# Patient Record
Sex: FEMALE | Race: WHITE
[De-identification: names, ages, dates, MRNs, and addresses within clinical notes are randomized per-mention and may not be internally consistent; named-entity substitution may affect disease eponyms.]

---

## 2021-07-17 ENCOUNTER — HOSPITAL ENCOUNTER (EMERGENCY)
Dept: HOSPITAL 75 - ER | Age: 66
Discharge: HOME | End: 2021-07-17
Payer: COMMERCIAL

## 2021-07-17 VITALS — HEIGHT: 66.02 IN | BODY MASS INDEX: 28.81 KG/M2 | WEIGHT: 179.24 LBS

## 2021-07-17 VITALS — SYSTOLIC BLOOD PRESSURE: 143 MMHG | DIASTOLIC BLOOD PRESSURE: 88 MMHG

## 2021-07-17 DIAGNOSIS — U07.1: Primary | ICD-10-CM

## 2021-07-17 LAB
ALBUMIN SERPL-MCNC: 4.2 GM/DL (ref 3.2–4.5)
ALP SERPL-CCNC: 62 U/L (ref 40–136)
ALT SERPL-CCNC: 20 U/L (ref 0–55)
BASOPHILS # BLD AUTO: 0 10^3/UL (ref 0–0.1)
BASOPHILS NFR BLD AUTO: 0 % (ref 0–10)
BILIRUB SERPL-MCNC: 0.3 MG/DL (ref 0.1–1)
BUN/CREAT SERPL: 10
CALCIUM SERPL-MCNC: 9.2 MG/DL (ref 8.5–10.1)
CHLORIDE SERPL-SCNC: 106 MMOL/L (ref 98–107)
CO2 SERPL-SCNC: 23 MMOL/L (ref 21–32)
CREAT SERPL-MCNC: 1.05 MG/DL (ref 0.6–1.3)
EOSINOPHIL # BLD AUTO: 0 10^3/UL (ref 0–0.3)
EOSINOPHIL NFR BLD AUTO: 0 % (ref 0–10)
GFR SERPLBLD BASED ON 1.73 SQ M-ARVRAT: 52 ML/MIN
GLUCOSE SERPL-MCNC: 99 MG/DL (ref 70–105)
HCT VFR BLD CALC: 46 % (ref 35–52)
HGB BLD-MCNC: 15.1 G/DL (ref 11.5–16)
LYMPHOCYTES # BLD AUTO: 0.8 10^3/UL (ref 1–4)
LYMPHOCYTES NFR BLD AUTO: 15 % (ref 12–44)
MANUAL DIFFERENTIAL PERFORMED BLD QL: NO
MCH RBC QN AUTO: 29 PG (ref 25–34)
MCHC RBC AUTO-ENTMCNC: 33 G/DL (ref 32–36)
MCV RBC AUTO: 87 FL (ref 80–99)
MONOCYTES # BLD AUTO: 0.7 10^3/UL (ref 0–1)
MONOCYTES NFR BLD AUTO: 14 % (ref 0–12)
NEUTROPHILS # BLD AUTO: 3.7 10^3/UL (ref 1.8–7.8)
NEUTROPHILS NFR BLD AUTO: 70 % (ref 42–75)
PLATELET # BLD: 163 10^3/UL (ref 130–400)
PMV BLD AUTO: 9.1 FL (ref 9–12.2)
POTASSIUM SERPL-SCNC: 4 MMOL/L (ref 3.6–5)
PROT SERPL-MCNC: 7.7 GM/DL (ref 6.4–8.2)
SODIUM SERPL-SCNC: 142 MMOL/L (ref 135–145)
WBC # BLD AUTO: 5.4 10^3/UL (ref 4.3–11)

## 2021-07-17 PROCEDURE — 74176 CT ABD & PELVIS W/O CONTRAST: CPT

## 2021-07-17 PROCEDURE — 87636 SARSCOV2 & INF A&B AMP PRB: CPT

## 2021-07-17 PROCEDURE — 80053 COMPREHEN METABOLIC PANEL: CPT

## 2021-07-17 PROCEDURE — 85025 COMPLETE CBC W/AUTO DIFF WBC: CPT

## 2021-07-17 PROCEDURE — 36415 COLL VENOUS BLD VENIPUNCTURE: CPT

## 2021-07-17 NOTE — DIAGNOSTIC IMAGING REPORT
PROCEDURE: CT abdomen and pelvis without contrast.



TECHNIQUE: Multiple contiguous axial images were obtained through

the abdomen and pelvis without the use of intravenous contrast.

Auto Exposure Controls were utilized during the CT exam to meet

ALARA standards for radiation dose reduction. 



INDICATION: Left lower quadrant pain, diverticulitis,

hysterectomy, Covid positive.



COMPARISON STUDY: CT of the abdomen and pelvis from 09/18/2015.



FINDINGS: The lung bases are clear. The liver, gallbladder,

spleen, pancreas, adrenal glands and kidneys are normal. The

appendix is not identified and is probably surgically absent.

Urinary bladder and prostate gland are normal. No hernia is

present. Diverticuli are present mainly in the sigmoid and

descending colon. No inflammatory changes are present. The

osseous structures demonstrate some mild degenerative changes.



IMPRESSION: There is diverticulosis. No inflammation is

identified. 



Dictated by: 



  Dictated on workstation # [id number]

## 2021-07-17 NOTE — ED ABDOMINAL PAIN
General


Chief Complaint:  Abdominal/GI Problems


Stated Complaint:  DIVERTICULITIS/ABD PAIN/SINUS INF


Nursing Triage Note:  


1155 PATIENT WAS TRIAGE BY TECH. TO ROOM AT 1306 C/O ABD PAIN WAS SEEN BY NP 


YESTERDAY AND STARTED ON FLAGY AND CIPRO NOT ANY BETTER.


Source of Information:  Patient


Exam Limitations:  No Limitations





History of Present Illness


Date Seen by Provider:  2021


Time Seen by Provider:  13:20


Initial Comments


Patient is a 66-year-old female who presents to the emergency department today 

with a chief complaint of left lower quadrant abdominal pain.  Patient states 

onset of symptoms about a week ago.  She saw her primary care doctor's nurse 

practitioner yesterday and was started on Cipro and Flagyl.  Patient continues 

to have diffuse abdominal discomfort and it feels like "a ton of bricks on my 

abdomen".  Patient also complains of some sinus congestion and a little 

shortness of breath with exertion, body aches and feeling flushed intermittently

since Wednesday.  Patient states that she has no known Covid positive contacts. 

She is not Covid vaccinated.  She denies severe headache.  She has had a little 

diarrhea likely is related to her left lower quadrant pain/diverticulitis.  She 

has had a little productive cough.





All other review of systems reviewed and negative except as stated above.


Timing/Duration:  1 Week


Severity/Quality:  Full


Location:  Generalized Abdomen


Radiation:  LLQ


Activities at Onset:  None


Modifying Factors:  Improves With Other (Antibiotics)


Associated Symptoms:  Fever/Chills ("Flushed"), Fatigue, Nausea/Vomiting (A 

little nausea), Shortness of Air (Shortness of air with exertion)





Allergies and Home Medications


Allergies


Coded Allergies:  


     No Allergy Information Available (Unverified , 9/18/15)





Patient Home Medication List


Home Medication List Reviewed:  Yes





Review of Systems


Review of Systems


Constitutional:  see HPI, other (Flushed)


EENTM:  No Symptoms Reported


Respiratory:  SOA With Exertion


Cardiovascular:  No Symptoms Reported


Gastrointestinal:  Abdominal Pain


Genitourinary:  No Symptoms Reported


Musculoskeletal:  no symptoms reported


Skin:  no symptoms reported


Psychiatric/Neurological:  No Symptoms Reported


Endocrine:  Flushing





All Other Systems Reviewed


Negative Unless Noted:  Yes





Past Medical-Social-Family Hx


Patient Social History


Substance use?:  No





Physical Exam


Vital Signs





Vital Signs - First Documented








 21





 13:06


 


Temp 37.1


 


Pulse 99


 


Resp 18


 


B/P (MAP) 143/88 (106)


 


Pulse Ox 98


 


O2 Delivery Room Air





Capillary Refill :


Height/Weight/BMI


Height: '"


Weight: lbs. oz. kg; 28.00 BMI


Method:


General Appearance:  WD/WN, no apparent distress


HEENT:  normal ENT inspection


Neck:  full range of motion


Respiratory:  lungs clear, normal breath sounds, no respiratory distress, no 

accessory muscle use


Cardiovascular:  regular rate, rhythm


Gastrointestinal:  normal bowel sounds, soft; No abnormal bowel sounds; 

tenderness (Left lower quadrant and left upper quadrant)


Extremities:  normal inspection, no pedal edema, no calf tenderness


Neurologic/Psychiatric:  alert, normal mood/affect, oriented x 3


Skin:  normal color, warm/dry





Progress/Results/Core Measures


Results/Orders


Lab Results





Laboratory Tests








Test


 21


13:12 21


14:12 Range/Units


 


 


White Blood Count


 5.4 


 


 4.3-11.0


10^3/uL


 


Red Blood Count


 5.26 H


 


 3.80-5.11


10^6/uL


 


Hemoglobin 15.1   11.5-16.0  g/dL


 


Hematocrit 46   35-52  %


 


Mean Corpuscular Volume 87   80-99  fL


 


Mean Corpuscular Hemoglobin 29   25-34  pg


 


Mean Corpuscular Hemoglobin


Concent 33 


 


 32-36  g/dL





 


Red Cell Distribution Width 13.1   10.0-14.5  %


 


Platelet Count


 163 


 


 130-400


10^3/uL


 


Mean Platelet Volume 9.1   9.0-12.2  fL


 


Immature Granulocyte % (Auto) 0    %


 


Neutrophils (%) (Auto) 70   42-75  %


 


Lymphocytes (%) (Auto) 15   12-44  %


 


Monocytes (%) (Auto) 14 H  0-12  %


 


Eosinophils (%) (Auto) 0   0-10  %


 


Basophils (%) (Auto) 0   0-10  %


 


Neutrophils # (Auto)


 3.7 


 


 1.8-7.8


10^3/uL


 


Lymphocytes # (Auto)


 0.8 L


 


 1.0-4.0


10^3/uL


 


Monocytes # (Auto)


 0.7 


 


 0.0-1.0


10^3/uL


 


Eosinophils # (Auto)


 0.0 


 


 0.0-0.3


10^3/uL


 


Basophils # (Auto)


 0.0 


 


 0.0-0.1


10^3/uL


 


Immature Granulocyte # (Auto)


 0.0 


 


 0.0-0.1


10^3/uL


 


Sodium Level 142   135-145  MMOL/L


 


Potassium Level 4.0   3.6-5.0  MMOL/L


 


Chloride Level 106     MMOL/L


 


Carbon Dioxide Level 23   21-32  MMOL/L


 


Anion Gap 13   5-14  MMOL/L


 


Blood Urea Nitrogen 10   7-18  MG/DL


 


Creatinine


 1.05 


 


 0.60-1.30


MG/DL


 


Estimat Glomerular Filtration


Rate 52 


 


  





 


BUN/Creatinine Ratio 10    


 


Glucose Level 99     MG/DL


 


Calcium Level 9.2   8.5-10.1  MG/DL


 


Corrected Calcium 9.0   8.5-10.1  MG/DL


 


Total Bilirubin 0.3   0.1-1.0  MG/DL


 


Aspartate Amino Transf


(AST/SGOT) 24 


 


 5-34  U/L





 


Alanine Aminotransferase


(ALT/SGPT) 20 


 


 0-55  U/L





 


Alkaline Phosphatase 62     U/L


 


Total Protein 7.7   6.4-8.2  GM/DL


 


Albumin 4.2   3.2-4.5  GM/DL


 


SARS-CoV-2 RNA (RT-PCR)  Detected H Not Detecte  








My Orders





Orders - RADHA GR MD


Ed Iv/Invasive Line Start (21 13:33)


Cbc With Automated Diff (21 13:33)


Comprehensive Metabolic Panel (21 13:33)


Covid 19 Inhouse Test (21 13:33)


Ct Abdomen/Pelvis Wo (21 13:33)





Vital Signs/I&O











 21





 13:06


 


Temp 37.1


 


Pulse 99


 


Resp 18


 


B/P (MAP) 143/88 (106)


 


Pulse Ox 98


 


O2 Delivery Room Air














Blood Pressure Mean:                    106











Progress


Progress Note :  


   Time:  15:44


Progress Note


Discussed with patient Regeneron therapy.  Advised her that the monoclonal 

antibody is an unapproved drug that is scheduled now for authorized use under 

the EUA.  I did discuss the risks and benefits and alternatives to receiving 

Regeneron.  To include possible allergic reaction and possible worsening of 

Covid symptoms and anaphylaxis.  Patient wishes to proceed.  Order has been 

written.





1629


Patient CT looks good no evidence of inflammatory changes in the sigmoid or 

descending colon.  Patient's symptoms are likely all related to Covid.  Patient 

is comfortable with discharge to home.  I recommended Tylenol, ibuprofen, 

fluids.  She is sent with the Regeneron order.  I have advised her that the 

pharmacy will contact her with an appointment time for infusion.  She verbalized

understanding.  All questions were sought and answered.  Patient is stable for 

discharge.





Diagnostic Imaging





   Diagonstic Imaging:  CT


Comments


                 [facility] [place], [place]





NAME:   MELINDA STEPHENS


[facility] REC#:   [id number]


ACCOUNT#:   [id number]


PT STATUS:   REG ER


:   1955


PHYSICIAN:   RADHA GR MD


ADMIT DATE:   21/ER


                                  ***Signed***


Date of Exam:21





CT ABDOMEN/PELVIS WO








PROCEDURE: CT abdomen and pelvis without contrast.





TECHNIQUE: Multiple contiguous axial images were obtained through


the abdomen and pelvis without the use of intravenous contrast.


Auto Exposure Controls were utilized during the CT exam to meet


ALARA standards for radiation dose reduction. 





INDICATION: Left lower quadrant pain, diverticulitis,


hysterectomy, Covid positive.





COMPARISON STUDY: CT of the abdomen and pelvis from 2015.





FINDINGS: The lung bases are clear. The liver, gallbladder,


spleen, pancreas, adrenal glands and kidneys are normal. The


appendix is not identified and is probably surgically absent.


Urinary bladder and prostate gland are normal. No hernia is


present. Diverticuli are present mainly in the sigmoid and


descending colon. No inflammatory changes are present. The


osseous structures demonstrate some mild degenerative changes.





IMPRESSION: There is diverticulosis. No inflammation is


identified. 





Dictated by: 





  Dictated on workstation # [id number]








Dict:   21 1534


Trans:   21 1608


[facility] [id number]





Interpreted by:     ANNA SPRINGER MD


Electronically signed by: ANNA SPRINGER MD 21 1608





Departure


Impression





   Primary Impression:  


   COVID-19


Disposition:  01 HOME, SELF-CARE


Condition:  Stable





Departure-Patient Inst.


Referrals:  


MARIA M FISHMAN MD (PCP/Family)


Primary Care Physician


Patient Instructions:  COVID-19 Overview





Add. Discharge Instructions:  


Drink plenty of fluids to stay well-hydrated.





Alternate Tylenol and ibuprofen as needed for pain and fever.





The pharmacy here at [facility] will call you with an infusion time for your 

Regeneron.  Please keep this appointment.





Come back to the emergency room if you have any worsening symptoms, shortness of

breath, severe headache, chest pain or any other emergent concerns.











RADHA GR MD         2021 13:37

## 2021-07-22 ENCOUNTER — HOSPITAL ENCOUNTER (OUTPATIENT)
Dept: HOSPITAL 75 - INFUSION | Age: 66
End: 2021-07-22
Attending: EMERGENCY MEDICINE
Payer: MEDICARE

## 2021-07-22 VITALS — SYSTOLIC BLOOD PRESSURE: 134 MMHG | DIASTOLIC BLOOD PRESSURE: 64 MMHG

## 2021-07-22 VITALS — WEIGHT: 179.24 LBS | HEIGHT: 66.02 IN | BODY MASS INDEX: 28.81 KG/M2

## 2021-07-22 VITALS — SYSTOLIC BLOOD PRESSURE: 135 MMHG | DIASTOLIC BLOOD PRESSURE: 72 MMHG

## 2021-07-22 DIAGNOSIS — Z23: Primary | ICD-10-CM

## 2021-07-22 DIAGNOSIS — U07.1: ICD-10-CM

## 2023-08-01 ENCOUNTER — HOSPITAL ENCOUNTER (EMERGENCY)
Dept: HOSPITAL 75 - ER | Age: 68
Discharge: HOME | End: 2023-08-01
Payer: MEDICARE

## 2023-08-01 VITALS — SYSTOLIC BLOOD PRESSURE: 148 MMHG | DIASTOLIC BLOOD PRESSURE: 65 MMHG

## 2023-08-01 DIAGNOSIS — R50.9: ICD-10-CM

## 2023-08-01 DIAGNOSIS — R10.32: Primary | ICD-10-CM

## 2023-08-01 DIAGNOSIS — R00.0: ICD-10-CM

## 2023-08-01 DIAGNOSIS — B34.9: ICD-10-CM

## 2023-08-01 DIAGNOSIS — Z86.16: ICD-10-CM

## 2023-08-01 DIAGNOSIS — R11.0: ICD-10-CM

## 2023-08-01 DIAGNOSIS — Z87.19: ICD-10-CM

## 2023-08-01 DIAGNOSIS — R79.82: ICD-10-CM

## 2023-08-01 LAB
ALBUMIN SERPL-MCNC: 4.4 GM/DL (ref 3.2–4.5)
ALP SERPL-CCNC: 75 U/L (ref 40–136)
ALT SERPL-CCNC: 16 U/L (ref 0–55)
AMORPH SED URNS QL MICRO: (no result) /LPF
APTT BLD: 29 SEC (ref 24–35)
APTT PPP: YELLOW S
BACTERIA #/AREA URNS HPF: (no result) /HPF
BASOPHILS # BLD AUTO: 0 10^3/UL (ref 0–0.1)
BASOPHILS NFR BLD AUTO: 1 % (ref 0–10)
BASOPHILS NFR BLD MANUAL: 1 %
BILIRUB SERPL-MCNC: 0.6 MG/DL (ref 0.1–1)
BILIRUB UR QL STRIP: NEGATIVE
BUN/CREAT SERPL: 17
CALCIUM SERPL-MCNC: 9.5 MG/DL (ref 8.5–10.1)
CHLORIDE SERPL-SCNC: 106 MMOL/L (ref 98–107)
CO2 SERPL-SCNC: 20 MMOL/L (ref 21–32)
CREAT SERPL-MCNC: 0.88 MG/DL (ref 0.6–1.3)
EOSINOPHIL # BLD AUTO: 0.2 10^3/UL (ref 0–0.3)
EOSINOPHIL NFR BLD AUTO: 2 % (ref 0–10)
EOSINOPHIL NFR BLD MANUAL: 1 %
FIBRINOGEN PPP-MCNC: CLEAR MG/DL
GFR SERPLBLD BASED ON 1.73 SQ M-ARVRAT: 72 ML/MIN
GLUCOSE SERPL-MCNC: 112 MG/DL (ref 70–105)
GLUCOSE UR STRIP-MCNC: NEGATIVE MG/DL
HCT VFR BLD CALC: 41 % (ref 35–52)
HGB BLD-MCNC: 14 G/DL (ref 11.5–16)
INR PPP: 1 (ref 0.8–1.4)
KETONES UR QL STRIP: NEGATIVE
LEUKOCYTE ESTERASE UR QL STRIP: (no result)
LIPASE SERPL-CCNC: 20 U/L (ref 8–78)
LYMPHOCYTES # BLD AUTO: 0.4 10^3/UL (ref 1–4)
LYMPHOCYTES NFR BLD AUTO: 5 % (ref 12–44)
MANUAL DIFFERENTIAL PERFORMED BLD QL: YES
MCH RBC QN AUTO: 29 PG (ref 25–34)
MCHC RBC AUTO-ENTMCNC: 34 G/DL (ref 32–36)
MCV RBC AUTO: 85 FL (ref 80–99)
MONOCYTES # BLD AUTO: 0.8 10^3/UL (ref 0–1)
MONOCYTES NFR BLD AUTO: 9 % (ref 0–12)
MONOCYTES NFR BLD: 8 %
NEUTROPHILS # BLD AUTO: 6.9 10^3/UL (ref 1.8–7.8)
NEUTROPHILS NFR BLD AUTO: 83 % (ref 42–75)
NEUTS BAND NFR BLD MANUAL: 86 %
NITRITE UR QL STRIP: NEGATIVE
PH UR STRIP: 7 [PH] (ref 5–9)
PLATELET # BLD EST: ADEQUATE 10*3/UL
PLATELET # BLD: 190 10^3/UL (ref 130–400)
PLATELET CLUMP BLD QL SMEAR: SLIGHT
PMV BLD AUTO: 9 FL (ref 9–12.2)
POTASSIUM SERPL-SCNC: 3.6 MMOL/L (ref 3.6–5)
PROT SERPL-MCNC: 7.7 GM/DL (ref 6.4–8.2)
PROT UR QL STRIP: NEGATIVE
PROTHROMBIN TIME: 13 SEC (ref 12.2–14.7)
RBC #/AREA URNS HPF: (no result) /HPF
RBC MORPH BLD: NORMAL
SODIUM SERPL-SCNC: 138 MMOL/L (ref 135–145)
SP GR UR STRIP: <=1.005 (ref 1.02–1.02)
VARIANT LYMPHS NFR BLD MANUAL: 1 %
VARIANT LYMPHS NFR BLD MANUAL: 3 %
WBC # BLD AUTO: 8.3 10^3/UL (ref 4.3–11)
WBC #/AREA URNS HPF: (no result) /HPF

## 2023-08-01 PROCEDURE — 85610 PROTHROMBIN TIME: CPT

## 2023-08-01 PROCEDURE — 87088 URINE BACTERIA CULTURE: CPT

## 2023-08-01 PROCEDURE — 85007 BL SMEAR W/DIFF WBC COUNT: CPT

## 2023-08-01 PROCEDURE — 74177 CT ABD & PELVIS W/CONTRAST: CPT

## 2023-08-01 PROCEDURE — 87040 BLOOD CULTURE FOR BACTERIA: CPT

## 2023-08-01 PROCEDURE — 81000 URINALYSIS NONAUTO W/SCOPE: CPT

## 2023-08-01 PROCEDURE — 86141 C-REACTIVE PROTEIN HS: CPT

## 2023-08-01 PROCEDURE — 85730 THROMBOPLASTIN TIME PARTIAL: CPT

## 2023-08-01 PROCEDURE — 36415 COLL VENOUS BLD VENIPUNCTURE: CPT

## 2023-08-01 PROCEDURE — 83605 ASSAY OF LACTIC ACID: CPT

## 2023-08-01 PROCEDURE — 83690 ASSAY OF LIPASE: CPT

## 2023-08-01 PROCEDURE — 85027 COMPLETE CBC AUTOMATED: CPT

## 2023-08-01 PROCEDURE — 80053 COMPREHEN METABOLIC PANEL: CPT

## 2023-08-01 NOTE — ED ABDOMINAL PAIN
General


Chief Complaint:  Abdominal/GI Problems


Stated Complaint:  FEVER, ABD PAIN


Source of Information:  Patient


Exam Limitations:  No Limitations





History of Present Illness


Date Seen by Provider:  Aug 1, 2023


Time Seen by Provider:  20:04


Initial Comments


68-year-old female with past medical history most notable for diverticulitis 

coming in due to 1-1/2 weeks of left lower quadrant abdominal pain, sharp, 

moderate to severe with associated nausea.  Had a normal bowel yesterday with no

blood.  He states this feels similar to prior episodes of diverticulitis for 

which she is never required surgery in the past.  Denies any dysuria, vaginal 

bleeding, vaginal discharge, vomiting, diarrhea, weakness, numbness, chest pain,

shortness of breath, or any other concerns.  She notes that she started having a

fever today, took ibuprofen and Tylenol roughly an hour prior to arrival.





Allergies and Home Medications


Allergies


Coded Allergies:  


     No Known Drug Allergies (Unverified , 21)





Patient Home Medication List


Home Medication List Reviewed:  Yes


Ondansetron (Ondansetron Odt) 4 Mg Tab.rapdis, 4 MG SL Q6H PRN for NAUSEA/VOMIT

ING


   Prescribed by: ÁNGEL GOLDSTEIN on 23





Review of Systems


Review of Systems


Constitutional:  fever


EENTM:  No Symptoms Reported


Respiratory:  No Symptoms Reported


Cardiovascular:  No Symptoms Reported


Gastrointestinal:  See HPI


Genitourinary:  No Symptoms Reported


Musculoskeletal:  no symptoms reported


Skin:  no symptoms reported


Psychiatric/Neurological:  No Symptoms Reported


Endocrine:  No Symptoms Reported


Hematologic/Lymphatic:  No Symptoms Reported





Past Medical-Social-Family Hx


Patient Social History


Tobacco Use?:  No


Substance use?:  No


Alcohol Use?:  No


Pt feels they are or have been:  No





Past Medical History


Surgery/Hospitalization HX:  


HYST, DIVERTICULITIS


Surgeries:  Yes


Hysterectomy, Tubal Ligation





Physical Exam


Vital Signs





Vital Signs - First Documented








 23





 20:07


 


Temp 39.1


 


Pulse 129


 


Resp 18


 


B/P (MAP) 175/118 (137)


 


Pulse Ox 96


 


O2 Delivery Room Air





Capillary Refill :


Height/Weight/BMI


Height: '"


Weight: lbs. oz. kg; 28.00 BMI


Method:


General Appearance:  WD/WN, no apparent distress


HEENT:  PERRL/EOMI, normal ENT inspection, pharynx normal


Neck:  non-tender, full range of motion, supple, normal inspection


Respiratory:  chest non-tender, lungs clear, normal breath sounds, no 

respiratory distress, no accessory muscle use


Cardiovascular:  no edema, no murmur, tachycardia


Gastrointestinal:  normal bowel sounds, soft; No distended, No guarding, No 

rebound; tenderness


Extremities:  normal range of motion, non-tender, normal inspection, no pedal 

edema, no calf tenderness, normal capillary refill


Back:  normal inspection, no CVA tenderness


Neurologic/Psychiatric:  no motor/sensory deficits, alert, normal mood/affect


Skin:  normal color, warm/dry





Focused Exam


Lactate Level


23 20:15: Lactic Acid Level 1.15





Lactic Acid Level





Laboratory Tests








Test


 23


20:15


 


Lactic Acid Level


 1.15 MMOL/L


(0.50-2.00)











Progress/Results/Core Measures


Results/Orders


Lab Results





Laboratory Tests








Test


 23


20:15 23


20:50 Range/Units


 


 


White Blood Count


 8.3 


 


 4.3-11.0


10^3/uL


 


Red Blood Count


 4.87 


 


 3.80-5.11


10^6/uL


 


Hemoglobin 14.0   11.5-16.0  g/dL


 


Hematocrit 41   35-52  %


 


Mean Corpuscular Volume 85   80-99  fL


 


Mean Corpuscular Hemoglobin 29   25-34  pg


 


Mean Corpuscular Hemoglobin


Concent 34 


 


 32-36  g/dL





 


Red Cell Distribution Width 13.2   10.0-14.5  %


 


Platelet Count


 190 


 


 130-400


10^3/uL


 


Mean Platelet Volume 9.0   9.0-12.2  fL


 


Immature Granulocyte % (Auto) 0    %


 


Neutrophils (%) (Auto) 83 H  42-75  %


 


Lymphocytes (%) (Auto) 5 L  12-44  %


 


Monocytes (%) (Auto) 9   0-12  %


 


Eosinophils (%) (Auto) 2   0-10  %


 


Basophils (%) (Auto) 1   0-10  %


 


Neutrophils # (Auto)


 6.9 


 


 1.8-7.8


10^3/uL


 


Lymphocytes # (Auto)


 0.4 L


 


 1.0-4.0


10^3/uL


 


Monocytes # (Auto)


 0.8 


 


 0.0-1.0


10^3/uL


 


Eosinophils # (Auto)


 0.2 


 


 0.0-0.3


10^3/uL


 


Basophils # (Auto)


 0.0 


 


 0.0-0.1


10^3/uL


 


Immature Granulocyte # (Auto)


 0.0 


 


 0.0-0.1


10^3/uL


 


Neutrophils % (Manual) 86    %


 


Lymphocytes % (Manual) 3    %


 


Monocytes % (Manual) 8    %


 


Eosinophils % (Manual) 1    %


 


Basophils % (Manual) 1    %


 


Atypical Lymphocytes 1    %


 


Platelet Estimate ADEQUATE    


 


Clumped Platelets SLIGHT    


 


Blood Morphology Comment NORMAL    


 


Prothrombin Time 13.0   12.2-14.7  SEC


 


INR Comment 1.0   0.8-1.4  


 


Activated Partial


Thromboplast Time 29 


 


 24-35  SEC





 


Sodium Level 138   135-145  MMOL/L


 


Potassium Level 3.6   3.6-5.0  MMOL/L


 


Chloride Level 106     MMOL/L


 


Carbon Dioxide Level 20 L  21-32  MMOL/L


 


Anion Gap 12   5-14  MMOL/L


 


Blood Urea Nitrogen 15   7-18  MG/DL


 


Creatinine


 0.88 


 


 0.60-1.30


MG/DL


 


Estimat Glomerular Filtration


Rate 72 


 


  





 


BUN/Creatinine Ratio 17    


 


Glucose Level 112 H    MG/DL


 


Lactic Acid Level


 1.15 


 


 0.50-2.00


MMOL/L


 


Calcium Level 9.5   8.5-10.1  MG/DL


 


Corrected Calcium 9.2   8.5-10.1  MG/DL


 


Total Bilirubin 0.6   0.1-1.0  MG/DL


 


Aspartate Amino Transf


(AST/SGOT) 21 


 


 5-34  U/L





 


Alanine Aminotransferase


(ALT/SGPT) 16 


 


 0-55  U/L





 


Alkaline Phosphatase 75     U/L


 


C-Reactive Protein High


Sensitivity 2.23 H


 


 0.00-0.50


MG/DL


 


Total Protein 7.7   6.4-8.2  GM/DL


 


Albumin 4.4   3.2-4.5  GM/DL


 


Lipase 20   8-78  U/L


 


Urine Color  YELLOW   


 


Urine Clarity  CLEAR   


 


Urine pH  7.0  5-9  


 


Urine Specific Gravity  <=1.005  1.016-1.022  


 


Urine Protein  NEGATIVE  NEGATIVE  


 


Urine Glucose (UA)  NEGATIVE  NEGATIVE  


 


Urine Ketones  NEGATIVE  NEGATIVE  


 


Urine Nitrite  NEGATIVE  NEGATIVE  


 


Urine Bilirubin  NEGATIVE  NEGATIVE  


 


Urine Urobilinogen  0.2  < = 1.0  MG/DL


 


Urine Leukocyte Esterase  2+ H NEGATIVE  


 


Urine RBC (Auto)  NEGATIVE  NEGATIVE  


 


Urine RBC  NONE   /HPF


 


Urine WBC  5-10 H  /HPF


 


Urine Squamous Epithelial


Cells 


 10-25 H


  /HPF





 


Urine Crystals  PRESENT H  /LPF


 


Urine Amorphous Sediment


 


 FEW IDA


URATES H  /LPF





 


Urine Bacteria  TRACE   /HPF


 


Urine Casts  NONE   /LPF


 


Urine Mucus  SMALL H  /LPF


 


Urine Culture Indicated  YES   








My Orders





Orders - ÁNGEL GOLDSTEIN MD


Ct Abdomen/Pelvis W (23 20:12)


Ed Iv/Invasive Line Start (23 20:12)


Cbc With Automated Diff (23 20:12)


Comprehensive Metabolic Panel (23 20:12)


Hs C Reactive Protein (23 20:12)


Lactic Acid Analyzer (23 20:12)


Lipase (23 20:12)


Protime With Inr (23 20:12)


Partial Thromboplastin Time (23 20:12)


Ua Culture If Indicated (23 20:12)


Morphine  Injection (Morphine  Injection (23 20:12)


Ondansetron Injection (Zofran Injectio (23 20:15)


Ns Iv 1000 Ml (Sodium Chloride 0.9%) (23 20:12)


Blood Culture (23 20:12)


Piperacillin Sodium/Tazobactam (Zosyn Vi (23 20:15)


Manual Differential (23 20:15)


Iohexol Injection (Omnipaque 350 Mg/Ml 1 (23 21:00)


Received Contrast (Hold Metformin- Contr (23 21:00)


Ns (Ivpb) 100 Ml (Sodium Chloride 0.9% 1 (23 21:00)


Urine Culture (23 20:50)





Medications Given in ED





Current Medications








 Medications  Dose


 Ordered  Sig/Michael


 Route  Start Time


 Stop Time Status Last Admin


Dose Admin


 


 Iohexol  100 ml  ONCE  ONCE


 IV  23 21:00


 23 21:28 DC 23 20:56


80 ML


 


 Ondansetron HCl  4 mg  ONCE  ONCE


 IVP  23 20:15


 23 20:16 DC 23 20:39


4 MG


 


 Piperacillin Sod/


 Tazobactam Sod


 4.5 gm/Sodium


 Chloride  100 ml @ 


 200 mls/hr  ONCE  ONCE


 IV  23 20:15


 23 20:44 DC 23 20:39


200 MLS/HR


 


 Sodium Chloride  100 ml  ONCE  ONCE


 IV  23 21:00


 23 21:28 DC 23 20:56


80 ML








Vital Signs/I&O











 23





 20:07


 


Temp 39.1


 


Pulse 129


 


Resp 18


 


B/P (MAP) 175/118 (137)


 


Pulse Ox 96


 


O2 Delivery Room Air











Progress


Progress Note :  


Progress Note


68-year-old female with above history coming in due to left lower quadrant pain 

and fever.  The patient was febrile and mildly tachycardic on arrival.  An IV 

was placed and basic labs were obtained including inflammatory markers.  Her 

white blood cell count is normal, high-sensitivity CRP just barely elevated, no

rmal creatinine, normal lactate.  She was given IV fluids and 500 mg of Tylenol 

to bring her dose up to 1000 mg of Tylenol in total.  Heart rate improved 

significantly and symptoms improved after morphine and Zofran as well.  CT 

abdomen pelvis ordered to assess for diverticulitis versus less likely bowel 

obstruction versus atypical appendicitis versus some other etiology.  The CT on 

my interpretation does not show any obvious inflammatory stranding or free air. 

It was read as negative for acute findings per the radiologist.  On 

reassessment, continues to be well-appearing, repeat abdominal exam with no 

signs of peritonitis.  Likely is a viral syndrome which will improve with time. 

I believe she stable for discharge with outpatient follow-up.  Prescription sent

for nausea medicines to her pharmacy.  She was sent home with strict return 

precautions





Of note, when reviewing the chart, the last time the patient had left lower 

quadrant pain, her CT was normal and she was diagnosed with COVID.  I offered to

swab her, but she does not want that at this time, she states she has test at 

home that she can use if she would like.





Diagnostic Imaging





   Diagonstic Imaging:  CT (abd/pelvis)


Comments


NAME:   MELINDA STEPHENS


[facility] REC#:   [id number]


ACCOUNT#:   [id number]


PT STATUS:   REG ER


:   1955


PHYSICIAN:   ÁNGEL GOLDSTEIN MD


ADMIT DATE:   23/ER


                                   ***Draft***


Date of Exam:23





CT ABDOMEN/PELVIS W








PROCEDURE: CT abdomen and pelvis with contrast.





TECHNIQUE: Multiple contiguous axial images were obtained through


the abdomen and pelvis after administration of intravenous


contrast. Auto Exposure Controls were utilized during the CT exam


to meet ALARA standards for radiation dose reduction. All CT


scans use one or more of the following dose optimizing


techniques: automated exposure control, MA and/or KvP adjustment


based on patient size and exam type or iterative reconstruction.





INDICATION: Left lower quadrant pain and fever





COMPARISON: 2021





FINDINGS:





Lung bases are clear. The heart is normal in size.





The liver demonstrates no focal lesions. The spleen appears


normal. The pancreas is unremarkable. The adrenal glands appear


normal. The kidneys demonstrate no focal lesions or


hydronephrosis.





The bowel loops are nondistended without obstruction. There is


diverticulosis in the sigmoid colon and the descending colon


without diverticulitis. There is moderate stool in the proximal


colon. The appendix is not seen but no secondary findings of


appendicitis are identified. The aorta is normal in caliber. No


lymphadenopathy is seen. No acute osseous abnormality is seen.


There are degenerative changes in the spine.





IMPRESSION:


1. No acute abnormality is seen in the abdomen and pelvis.


2. Colonic diverticulosis without diverticulitis.








  Dictated on workstation # [id number]








Dict:   23


Trans:   23


[facility] [id number]





Interpreted by:     EDYTA WU MD


Electronically signed by:





Departure


Impression





   Primary Impression:  


   LLQ pain


   Additional Impression:  


   Viral syndrome


Disposition:  01 HOME, SELF-CARE


Condition:  Stable





Departure-Patient Inst.


Decision time for Depature:  21:40


Referrals:  


SEAN FERRER MD (PCP/Family)


Primary Care Physician


Patient Instructions:  Severe Abdominal Pain, Adult (DC)





Add. Discharge Instructions:  


Your labs are reassuring, and your CT was negative for diverticulitis or any 

other acute concerns at this time.  Unfortunately this is likely viral and will 

just take time to pass.  Continue to take ibuprofen and/or Tylenol as needed for

fever and pain.  Nausea medicines were sent to your pharmacy as well.  If you 

are not seeing improvement, are unable to keep liquids down, then we would want 

you to follow back up with your regular doctor in the next couple of days


Scripts


Ondansetron (Ondansetron Odt) 4 Mg Tab.rapdis


4 MG SL Q6H PRN for NAUSEA/VOMITING for 5 Days, #20 TAB


   Prov: ÁNGEL GOLDSTEIN MD         23


Work/School Note:  Work Release Form   Date Seen in the Emergency Department:  [facility]

2023


   Return to Work:  Aug 3, 2023


   Restrictions:  Return-No Fever (24hrs), Return-No Vomiting(24hrs)











ÁNGEL GOLDSTEIN MD           Aug 1, 2023 20:16

## 2023-08-01 NOTE — DIAGNOSTIC IMAGING REPORT
PROCEDURE: CT abdomen and pelvis with contrast.



TECHNIQUE: Multiple contiguous axial images were obtained through

the abdomen and pelvis after administration of intravenous

contrast. Auto Exposure Controls were utilized during the CT exam

to meet ALARA standards for radiation dose reduction. All CT

scans use one or more of the following dose optimizing

techniques: automated exposure control, MA and/or KvP adjustment

based on patient size and exam type or iterative reconstruction.



INDICATION: Left lower quadrant pain and fever



COMPARISON: 07/17/2021



FINDINGS:



Lung bases are clear. The heart is normal in size.



The liver demonstrates no focal lesions. The spleen appears

normal. The pancreas is unremarkable. The adrenal glands appear

normal. The kidneys demonstrate no focal lesions or

hydronephrosis.



The bowel loops are nondistended without obstruction. There is

diverticulosis in the sigmoid colon and the descending colon

without diverticulitis. There is moderate stool in the proximal

colon. The appendix is not seen but no secondary findings of

appendicitis are identified. The aorta is normal in caliber. No

lymphadenopathy is seen. No acute osseous abnormality is seen.

There are degenerative changes in the spine.



IMPRESSION:

1. No acute abnormality is seen in the abdomen and pelvis.

2. Colonic diverticulosis without diverticulitis.



Dictated by: 



  Dictated on workstation # [id number]

## 2023-08-03 ENCOUNTER — HOSPITAL ENCOUNTER (EMERGENCY)
Dept: HOSPITAL 75 - ER | Age: 68
Discharge: HOME | End: 2023-08-03
Payer: MEDICARE

## 2023-08-03 VITALS — BODY MASS INDEX: 28.43 KG/M2 | HEIGHT: 65.75 IN | WEIGHT: 174.83 LBS

## 2023-08-03 VITALS — DIASTOLIC BLOOD PRESSURE: 82 MMHG | SYSTOLIC BLOOD PRESSURE: 137 MMHG

## 2023-08-03 DIAGNOSIS — R09.02: ICD-10-CM

## 2023-08-03 DIAGNOSIS — R50.9: ICD-10-CM

## 2023-08-03 DIAGNOSIS — Z28.310: ICD-10-CM

## 2023-08-03 DIAGNOSIS — U07.1: Primary | ICD-10-CM

## 2023-08-03 DIAGNOSIS — R06.02: ICD-10-CM

## 2023-08-03 DIAGNOSIS — R11.0: ICD-10-CM

## 2023-08-03 DIAGNOSIS — Z73.0: ICD-10-CM

## 2023-08-03 DIAGNOSIS — R52: ICD-10-CM

## 2023-08-03 PROCEDURE — 71045 X-RAY EXAM CHEST 1 VIEW: CPT

## 2023-08-03 NOTE — ED COUGH/URI
General


Chief Complaint:  COVID19 Suspect/Confirmed


Stated Complaint:  COVID+


Source:  patient


Exam Limitations:  no limitations





History of Present Illness


Date Seen by Provider:  Aug 3, 2023


Time Seen by Provider:  10:10


Initial Comments


68-year-old female presents to the emergency department today after testing 

positive for COVID.  She initially had a home test on Tuesday that was positive 

which was confirmed today at the clinic.  She was advised to come here if her 

symptoms worsen.  She states her oxygen saturation on her home O2 monitor was 

between 86 to 88% which was the reason for her concern.  She is feeling slightly

short of breath and has mildly productive cough.  She has had fevers and body 

aches as well.  Her primary care doctor provided Paxlovid today which she has 

not yet picked up.





All other systems reviewed and negative except documented per HPI.





Voice recognition software was used to help create this chart





Allergies and Home Medications


Allergies


Coded Allergies:  


     No Known Drug Allergies (Unverified , 7/22/21)





Patient Home Medication List


Home Medication List Reviewed:  Yes


Ondansetron (Ondansetron Odt) 4 Mg Tab.rapdis, 4 MG SL Q6H PRN for 

NAUSEA/VOMITING


   Prescribed by: ÁNGEL GOLDSTEIN on 8/1/23 2129





Review of Systems


Review of Systems


Constitutional:  see HPI





Past Medical-Social-Family Hx


Patient Social History


Tobacco Use?:  No


Use of E-Cig and/or Vaping dev:  No


Substance use?:  No


Alcohol Use?:  No





Past Medical History


Surgery/Hospitalization HX:  


HYST, DIVERTICULITIS


Surgeries:  Yes


Hysterectomy, Tubal Ligation





Physical Exam





Vital Signs - First Documented








 8/3/23





 10:14


 


Temp 39.8


 


Pulse 109


 


Resp 21


 


B/P (MAP) 174/87 (116)


 


Pulse Ox 92


 


O2 Delivery Room Air





Capillary Refill :


Height: '"


Weight: lbs. oz. kg; 28.00 BMI


Method:


General Appearance:  WD/WN, no apparent distress


HEENT:  normal ENT inspection, pharynx normal


Neck:  non-tender, supple


Respiratory:  chest non-tender, no respiratory distress, no accessory muscle 

use, other (Coarse breath sounds bilaterally that seem to be worse on the right 

side.)


Cardiovascular:  no murmur, tachycardia


Gastrointestinal:  normal bowel sounds, non tender, soft


Extremities:  non-tender, normal inspection, no pedal edema, normal capillary 

refill


Neurologic/Psychiatric:  alert, oriented x 3


Skin:  normal color, warm/dry





Progress/Results/Core Measures


Suspected Sepsis


SIRS


Temperature: 


Pulse:  


Respiratory Rate: 


 


Blood Pressure  / 


Mean:





Results/Orders


My Orders





Orders - HEIDYASHLEY CARRINGTON DO


Chest 1 View, Ap/Pa Only (8/3/23 10:17)


Acetaminophen  Tablet (Acetaminophen  Ta (8/3/23 10:30)





Medications Given in ED





Current Medications








 Medications  Dose


 Ordered  Sig/Michael


 Route  Start Time


 Stop Time Status Last Admin


Dose Admin


 


 Acetaminophen  1,000 mg  ONCE  ONCE


 PO  8/3/23 10:30


 8/3/23 10:31 DC 8/3/23 10:32


1,000 MG








Vital Signs/I&O











 8/3/23 8/3/23





 10:14 10:32


 


Temp 39.8 39.8


 


Pulse 109 


 


Resp 21 


 


B/P (MAP) 174/87 (116) 


 


Pulse Ox 92 


 


O2 Delivery Room Air 





Capillary Refill :





Departure


Communication (Admissions)


Patient is hemodynamically stable with oxygen saturation 90 to 92% initially 

after ambulating to the room.  She is on room air.  She is in no respiratory 

distress.  Lung findings do seem to be worse on the right side so we will go 

ahead and get a chest x-ray to ensure there is no focal infiltrate that would 

indicate a bacterial infection on top of her COVID infection.





134: Chest x-ray is clear.  No evidence for focal pneumonia that would indicate 

bacterial infection.  She has Paxlovid prescribed by her primary care provider 

and she will pick it up today.  She has Zofran that she use for nausea.  When I 

went in to reevaluate her her oxygen saturation was 86 to 87% on room air.  She 

was visibly short of breath.  With that she will likely qualify for home oxygen 

related to COVID-pneumonia and hypoxia.  I filled out the paperwork and we will 

fax this on.





Impression





   Primary Impression:  


   COVID-19


   Additional Impression:  


   Hypoxia


Disposition:  01 HOME, SELF-CARE


Condition:  Stable





Departure-Patient Inst.


Referrals:  


SEAN FERRER MD (PCP/Family)


Primary Care Physician


Patient Instructions:  COVID-19 ED





Add. Discharge Instructions:  


You have COVID.  Your chest x-ray is clear.  Your oxygen levels were low and we 

have set up home oxygen for you.  Increase your fluids at home, rest as needed. 

Alternate ibuprofen and Tylenol as needed for body aches, fevers and pain.  

Continue to use your home Zofran as needed for nausea.   the Paxlovid as 

provided by your primary care doctor.  Return to the emergency department for 

any severe shortness of breath or severe chest pain or if your symptoms change 

in any way concerning to you.





All discharge instructions reviewed with patient and/or family. Voiced 

understanding.











ASHLEY MOODY DO             Aug 3, 2023 10:20

## 2023-08-03 NOTE — DIAGNOSTIC IMAGING REPORT
INDICATION: Positive COVID, hypoxia.



COMPARISON: No priors.



FINDINGS: Lungs are clear. No failure, effusion, or pneumothorax.



IMPRESSION: Normal frontal chest.



Dictated by: 



  Dictated on workstation # [id number]